# Patient Record
Sex: FEMALE | Race: WHITE | NOT HISPANIC OR LATINO
[De-identification: names, ages, dates, MRNs, and addresses within clinical notes are randomized per-mention and may not be internally consistent; named-entity substitution may affect disease eponyms.]

---

## 2017-11-09 ENCOUNTER — APPOINTMENT (OUTPATIENT)
Dept: OBGYN | Facility: CLINIC | Age: 82
End: 2017-11-09
Payer: MEDICARE

## 2017-11-09 ENCOUNTER — LABORATORY RESULT (OUTPATIENT)
Age: 82
End: 2017-11-09

## 2017-11-09 VITALS
HEIGHT: 62 IN | WEIGHT: 148 LBS | BODY MASS INDEX: 27.23 KG/M2 | SYSTOLIC BLOOD PRESSURE: 128 MMHG | DIASTOLIC BLOOD PRESSURE: 70 MMHG

## 2017-11-09 DIAGNOSIS — Z86.39 PERSONAL HISTORY OF OTHER ENDOCRINE, NUTRITIONAL AND METABOLIC DISEASE: ICD-10-CM

## 2017-11-09 DIAGNOSIS — Z86.79 PERSONAL HISTORY OF OTHER DISEASES OF THE CIRCULATORY SYSTEM: ICD-10-CM

## 2017-11-09 DIAGNOSIS — N81.5 VAGINAL ENTEROCELE: ICD-10-CM

## 2017-11-09 PROCEDURE — 81003 URINALYSIS AUTO W/O SCOPE: CPT | Mod: QW

## 2017-11-09 PROCEDURE — 99397 PER PM REEVAL EST PAT 65+ YR: CPT

## 2017-11-17 DIAGNOSIS — T83.510A INFECTION AND INFLAMMATORY REACTION DUE TO CYSTOSTOMY CATHETER, INITIAL ENCOUNTER: ICD-10-CM

## 2017-11-17 DIAGNOSIS — N39.0 INFECTION AND INFLAMMATORY REACTION DUE TO CYSTOSTOMY CATHETER, INITIAL ENCOUNTER: ICD-10-CM

## 2017-11-17 DIAGNOSIS — N30.00 ACUTE CYSTITIS W/OUT HEMATURIA: ICD-10-CM

## 2017-11-21 ENCOUNTER — RX RENEWAL (OUTPATIENT)
Age: 82
End: 2017-11-21

## 2017-11-21 ENCOUNTER — RESULT REVIEW (OUTPATIENT)
Age: 82
End: 2017-11-21

## 2017-11-21 ENCOUNTER — MESSAGE (OUTPATIENT)
Age: 82
End: 2017-11-21

## 2017-11-21 LAB
APPEARANCE: ABNORMAL
BILIRUBIN URINE: NEGATIVE
BLOOD URINE: ABNORMAL
COLOR: YELLOW
GLUCOSE QUALITATIVE U: NEGATIVE MG/DL
KETONES URINE: NEGATIVE
LEUKOCYTE ESTERASE URINE: ABNORMAL
NITRITE URINE: POSITIVE
PH URINE: 6
PROTEIN URINE: NEGATIVE MG/DL
SPECIFIC GRAVITY URINE: 1.01
UROBILINOGEN URINE: NEGATIVE MG/DL

## 2017-11-28 ENCOUNTER — OTHER (OUTPATIENT)
Age: 82
End: 2017-11-28

## 2017-11-30 ENCOUNTER — RESULT REVIEW (OUTPATIENT)
Age: 82
End: 2017-11-30

## 2018-01-03 LAB
BILIRUB UR QL STRIP: NORMAL
CLARITY UR: CLEAR
COLLECTION METHOD: NORMAL
CYTOLOGY CVX/VAG DOC THIN PREP: NORMAL
GLUCOSE UR-MCNC: NORMAL
HCG UR QL: 0.2 EU/DL
HGB UR QL STRIP.AUTO: NORMAL
KETONES UR-MCNC: NORMAL
LEUKOCYTE ESTERASE UR QL STRIP: NORMAL
NITRITE UR QL STRIP: POSITIVE
PH UR STRIP: 5.5
PROT UR STRIP-MCNC: NORMAL
SP GR UR STRIP: 1.01

## 2018-05-25 ENCOUNTER — INPATIENT (INPATIENT)
Facility: HOSPITAL | Age: 83
LOS: 0 days | Discharge: HOME HEALTH | End: 2018-05-26
Attending: FAMILY MEDICINE | Admitting: FAMILY MEDICINE
Payer: MEDICARE

## 2018-05-25 VITALS — WEIGHT: 153 LBS | HEIGHT: 65 IN

## 2018-05-25 DIAGNOSIS — Z90.710 ACQUIRED ABSENCE OF BOTH CERVIX AND UTERUS: Chronic | ICD-10-CM

## 2018-05-25 LAB
ALBUMIN SERPL ELPH-MCNC: 4.2 G/DL — SIGNIFICANT CHANGE UP (ref 3.3–5)
ALP SERPL-CCNC: 64 U/L — SIGNIFICANT CHANGE UP (ref 40–120)
ALT FLD-CCNC: 28 U/L — SIGNIFICANT CHANGE UP (ref 12–78)
ANION GAP SERPL CALC-SCNC: 5 MMOL/L — SIGNIFICANT CHANGE UP (ref 5–17)
APTT BLD: 29.2 SEC — SIGNIFICANT CHANGE UP (ref 27.5–37.4)
AST SERPL-CCNC: 19 U/L — SIGNIFICANT CHANGE UP (ref 15–37)
BASOPHILS # BLD AUTO: 0.06 K/UL — SIGNIFICANT CHANGE UP (ref 0–0.2)
BASOPHILS NFR BLD AUTO: 0.7 % — SIGNIFICANT CHANGE UP (ref 0–2)
BILIRUB SERPL-MCNC: 0.8 MG/DL — SIGNIFICANT CHANGE UP (ref 0.2–1.2)
BLD GP AB SCN SERPL QL: SIGNIFICANT CHANGE UP
BUN SERPL-MCNC: 26 MG/DL — HIGH (ref 7–23)
CALCIUM SERPL-MCNC: 9.7 MG/DL — SIGNIFICANT CHANGE UP (ref 8.5–10.1)
CHLORIDE SERPL-SCNC: 101 MMOL/L — SIGNIFICANT CHANGE UP (ref 96–108)
CK SERPL-CCNC: 131 U/L — SIGNIFICANT CHANGE UP (ref 26–192)
CO2 SERPL-SCNC: 28 MMOL/L — SIGNIFICANT CHANGE UP (ref 22–31)
CREAT SERPL-MCNC: 1.11 MG/DL — SIGNIFICANT CHANGE UP (ref 0.5–1.3)
EOSINOPHIL # BLD AUTO: 0.15 K/UL — SIGNIFICANT CHANGE UP (ref 0–0.5)
EOSINOPHIL NFR BLD AUTO: 1.7 % — SIGNIFICANT CHANGE UP (ref 0–6)
GLUCOSE SERPL-MCNC: 144 MG/DL — HIGH (ref 70–99)
HCT VFR BLD CALC: 39 % — SIGNIFICANT CHANGE UP (ref 34.5–45)
HGB BLD-MCNC: 13.1 G/DL — SIGNIFICANT CHANGE UP (ref 11.5–15.5)
IMM GRANULOCYTES NFR BLD AUTO: 0.1 % — SIGNIFICANT CHANGE UP (ref 0–1.5)
INR BLD: 0.99 RATIO — SIGNIFICANT CHANGE UP (ref 0.88–1.16)
LYMPHOCYTES # BLD AUTO: 2.28 K/UL — SIGNIFICANT CHANGE UP (ref 1–3.3)
LYMPHOCYTES # BLD AUTO: 25.9 % — SIGNIFICANT CHANGE UP (ref 13–44)
MAGNESIUM SERPL-MCNC: 2.6 MG/DL — SIGNIFICANT CHANGE UP (ref 1.6–2.6)
MCHC RBC-ENTMCNC: 30 PG — SIGNIFICANT CHANGE UP (ref 27–34)
MCHC RBC-ENTMCNC: 33.6 GM/DL — SIGNIFICANT CHANGE UP (ref 32–36)
MCV RBC AUTO: 89.2 FL — SIGNIFICANT CHANGE UP (ref 80–100)
MONOCYTES # BLD AUTO: 0.6 K/UL — SIGNIFICANT CHANGE UP (ref 0–0.9)
MONOCYTES NFR BLD AUTO: 6.8 % — SIGNIFICANT CHANGE UP (ref 2–14)
NEUTROPHILS # BLD AUTO: 5.72 K/UL — SIGNIFICANT CHANGE UP (ref 1.8–7.4)
NEUTROPHILS NFR BLD AUTO: 64.8 % — SIGNIFICANT CHANGE UP (ref 43–77)
NRBC # BLD: 0 /100 WBCS — SIGNIFICANT CHANGE UP (ref 0–0)
PHOSPHATE SERPL-MCNC: 3.7 MG/DL — SIGNIFICANT CHANGE UP (ref 2.5–4.5)
PLATELET # BLD AUTO: 192 K/UL — SIGNIFICANT CHANGE UP (ref 150–400)
POTASSIUM SERPL-MCNC: 3.7 MMOL/L — SIGNIFICANT CHANGE UP (ref 3.5–5.3)
POTASSIUM SERPL-SCNC: 3.7 MMOL/L — SIGNIFICANT CHANGE UP (ref 3.5–5.3)
PROT SERPL-MCNC: 8.1 GM/DL — SIGNIFICANT CHANGE UP (ref 6–8.3)
PROTHROM AB SERPL-ACNC: 10.7 SEC — SIGNIFICANT CHANGE UP (ref 9.8–12.7)
RBC # BLD: 4.37 M/UL — SIGNIFICANT CHANGE UP (ref 3.8–5.2)
RBC # FLD: 13.2 % — SIGNIFICANT CHANGE UP (ref 10.3–14.5)
SODIUM SERPL-SCNC: 134 MMOL/L — LOW (ref 135–145)
TROPONIN I SERPL-MCNC: <0.015 NG/ML — SIGNIFICANT CHANGE UP (ref 0.01–0.04)
TYPE + AB SCN PNL BLD: SIGNIFICANT CHANGE UP
WBC # BLD: 8.82 K/UL — SIGNIFICANT CHANGE UP (ref 3.8–10.5)
WBC # FLD AUTO: 8.82 K/UL — SIGNIFICANT CHANGE UP (ref 3.8–10.5)

## 2018-05-25 PROCEDURE — 99291 CRITICAL CARE FIRST HOUR: CPT

## 2018-05-25 PROCEDURE — 70544 MR ANGIOGRAPHY HEAD W/O DYE: CPT | Mod: 26,59

## 2018-05-25 PROCEDURE — 70551 MRI BRAIN STEM W/O DYE: CPT | Mod: 26

## 2018-05-25 PROCEDURE — 70450 CT HEAD/BRAIN W/O DYE: CPT | Mod: 26,77

## 2018-05-25 PROCEDURE — 93010 ELECTROCARDIOGRAM REPORT: CPT

## 2018-05-25 PROCEDURE — 70547 MR ANGIOGRAPHY NECK W/O DYE: CPT | Mod: 26

## 2018-05-25 PROCEDURE — 70450 CT HEAD/BRAIN W/O DYE: CPT | Mod: 26

## 2018-05-25 RX ORDER — ALPRAZOLAM 0.25 MG
0.25 TABLET ORAL ONCE
Qty: 0 | Refills: 0 | Status: DISCONTINUED | OUTPATIENT
Start: 2018-05-25 | End: 2018-05-25

## 2018-05-25 RX ORDER — CHOLECALCIFEROL (VITAMIN D3) 125 MCG
1000 CAPSULE ORAL DAILY
Qty: 0 | Refills: 0 | Status: DISCONTINUED | OUTPATIENT
Start: 2018-05-25 | End: 2018-05-26

## 2018-05-25 RX ORDER — LABETALOL HCL 100 MG
10 TABLET ORAL ONCE
Qty: 0 | Refills: 0 | Status: COMPLETED | OUTPATIENT
Start: 2018-05-25 | End: 2018-05-25

## 2018-05-25 RX ORDER — CARVEDILOL PHOSPHATE 80 MG/1
1 CAPSULE, EXTENDED RELEASE ORAL
Qty: 0 | Refills: 0 | COMMUNITY

## 2018-05-25 RX ORDER — LOSARTAN/HYDROCHLOROTHIAZIDE 100MG-25MG
1 TABLET ORAL
Qty: 0 | Refills: 0 | COMMUNITY

## 2018-05-25 RX ORDER — OMEGA-3 ACID ETHYL ESTERS 1 G
1 CAPSULE ORAL
Qty: 0 | Refills: 0 | COMMUNITY

## 2018-05-25 RX ORDER — LOSARTAN POTASSIUM 100 MG/1
100 TABLET, FILM COATED ORAL DAILY
Qty: 0 | Refills: 0 | Status: DISCONTINUED | OUTPATIENT
Start: 2018-05-25 | End: 2018-05-26

## 2018-05-25 RX ORDER — ACETAMINOPHEN 500 MG
650 TABLET ORAL EVERY 6 HOURS
Qty: 0 | Refills: 0 | Status: DISCONTINUED | OUTPATIENT
Start: 2018-05-25 | End: 2018-05-26

## 2018-05-25 RX ORDER — CHOLECALCIFEROL (VITAMIN D3) 125 MCG
1 CAPSULE ORAL
Qty: 0 | Refills: 0 | COMMUNITY

## 2018-05-25 RX ORDER — ATORVASTATIN CALCIUM 80 MG/1
40 TABLET, FILM COATED ORAL AT BEDTIME
Qty: 0 | Refills: 0 | Status: DISCONTINUED | OUTPATIENT
Start: 2018-05-25 | End: 2018-05-26

## 2018-05-25 RX ORDER — LEVOTHYROXINE SODIUM 125 MCG
75 TABLET ORAL DAILY
Qty: 0 | Refills: 0 | Status: DISCONTINUED | OUTPATIENT
Start: 2018-05-25 | End: 2018-05-26

## 2018-05-25 RX ORDER — CARVEDILOL PHOSPHATE 80 MG/1
25 CAPSULE, EXTENDED RELEASE ORAL EVERY 12 HOURS
Qty: 0 | Refills: 0 | Status: DISCONTINUED | OUTPATIENT
Start: 2018-05-25 | End: 2018-05-26

## 2018-05-25 RX ORDER — ALPRAZOLAM 0.25 MG
0.5 TABLET ORAL
Qty: 0 | Refills: 0 | Status: DISCONTINUED | OUTPATIENT
Start: 2018-05-25 | End: 2018-05-26

## 2018-05-25 RX ORDER — SENNA PLUS 8.6 MG/1
2 TABLET ORAL AT BEDTIME
Qty: 0 | Refills: 0 | Status: DISCONTINUED | OUTPATIENT
Start: 2018-05-25 | End: 2018-05-26

## 2018-05-25 RX ORDER — ALPRAZOLAM 0.25 MG
1 TABLET ORAL
Qty: 0 | Refills: 0 | COMMUNITY

## 2018-05-25 RX ORDER — ONDANSETRON 8 MG/1
4 TABLET, FILM COATED ORAL EVERY 6 HOURS
Qty: 0 | Refills: 0 | Status: DISCONTINUED | OUTPATIENT
Start: 2018-05-25 | End: 2018-05-26

## 2018-05-25 RX ORDER — LEVOTHYROXINE SODIUM 125 MCG
1 TABLET ORAL
Qty: 0 | Refills: 0 | COMMUNITY

## 2018-05-25 RX ADMIN — ATORVASTATIN CALCIUM 40 MILLIGRAM(S): 80 TABLET, FILM COATED ORAL at 22:56

## 2018-05-25 RX ADMIN — LOSARTAN POTASSIUM 100 MILLIGRAM(S): 100 TABLET, FILM COATED ORAL at 16:57

## 2018-05-25 RX ADMIN — CARVEDILOL PHOSPHATE 25 MILLIGRAM(S): 80 CAPSULE, EXTENDED RELEASE ORAL at 16:56

## 2018-05-25 RX ADMIN — Medication 0.25 MILLIGRAM(S): at 04:59

## 2018-05-25 RX ADMIN — Medication 1000 UNIT(S): at 16:56

## 2018-05-25 NOTE — ED PROVIDER NOTE - CARE PLAN
Principal Discharge DX:	Headache  Secondary Diagnosis:	Concussion Principal Discharge DX:	Headache  Secondary Diagnosis:	Concussion  Secondary Diagnosis:	Intracranial bleeding

## 2018-05-25 NOTE — H&P ADULT - NSHPPHYSICALEXAM_GEN_ALL_CORE
PHYSICAL EXAM:    Constitutional: NAD, awake and alert, well-developed  HEENT: PERR, EOMI, Normal Hearing, MMM  Neck: Soft and supple, No LAD, No JVD  Respiratory: Breath sounds are clear bilaterally, No wheezing, rales or rhonchi  Cardiovascular: S1 and S2, regular rate and rhythm, no Murmurs, gallops or rubs  Gastrointestinal: Bowel Sounds present, soft, nontender, nondistended, no guarding, no rebound  Extremities: No peripheral edema  Vascular: 2+ peripheral pulses  Neurological: A/O x 3, no focal deficits  Musculoskeletal: 5/5 strength b/l upper and lower extremities  Skin: No rashes  rectal : deferred, not indicated

## 2018-05-25 NOTE — ED PROVIDER NOTE - OBJECTIVE STATEMENT
pt presents afgter mchanical fall while watering plants with post occipital heamtoma acchymosis non expansile and post constant achy non radiating HA no loc on asa no other blood thinners. denies fever. denies  neck pain. no chest pain or sob. no abd pain. no n/v/d. no urinary f/u/d. no back pain. no motor or sensory deficits. denies illicit drug use.  . no other acute issues symptoms or concerns

## 2018-05-25 NOTE — ED ADULT TRIAGE NOTE - CHIEF COMPLAINT QUOTE
trip and fall on patio at 6pm today, hit back of head. no LOC. reports brief period of nausea after fall, now resolved. on aspirin. seen at urgent care, sent from urgent care to ED for CT scan. patient a+ox4, PERRL, respirations even and unlabored, ms strength 5/5 upper and lower extremities bilaterally. ambulatory with steady gai. denies pain at this time.

## 2018-05-25 NOTE — ED PROVIDER NOTE - CRITICAL CARE PROVIDED
consultation with other physicians/interpretation of diagnostic studies/direct patient care (not related to procedure)/documentation/consult w/ pt's family directly relating to pts condition/additional history taking

## 2018-05-25 NOTE — ED ADULT NURSE NOTE - OBJECTIVE STATEMENT
Pt presents to the ED after a fall at home.  Pt states that she hit the back of her head, denies LOC, dizziness, nausea/vomiting. Pt is on aspirin and was sent from the urgent care for a CT of the head.

## 2018-05-25 NOTE — ED ADULT NURSE REASSESSMENT NOTE - COMFORT CARE
ambulated to bathroom
nothing else needed at this time. BG/assisted to bedpan
assisted to bedpan/plan of care explained
assisted to bedpan/plan of care explained/ice chips
meal provided/plan of care explained/po fluids offered
plan of care explained
plan of care explained

## 2018-05-25 NOTE — CONSULT NOTE ADULT - SUBJECTIVE AND OBJECTIVE BOX
Patient is a 86y old  Female who presents with a chief complaint of S/P fall, no LOC  Asymptomatic at present  CT/MRI revealing a small hemorrhagic stroke.  Denies CP/SOB or any other symptoms    HPI:HTN/Hyperlipidemia      PAST MEDICAL & SURGICAL HISTORY:  No significant past surgical history      HPI:                PREVIOUS DIAGNOSTIC TESTING:      ECHO  FINDINGS:    STRESS  FINDINGS:    CATHETERIZATION  FINDINGS:    MEDICATIONS  (STANDING):    MEDICATIONS  (PRN):      FAMILY HISTORY:  No pertinent family history in first degree relatives      SOCIAL HISTORY:    CIGARETTES:    ALCOHOL:    REVIEW OF SYSTEMS:  CONSTITUTIONAL:  No night sweats.  No fatigue, malaise, lethargy.  No fever or chills.  HEENT:  Eyes:  No visual changes.  No eye pain.  No eye discharge.    ENT:  No runny nose.  No epistaxis.  No sinus pain.  No sore throat.  No odynophagia.  No ear pain.  No congestion.  RESPIRATORY:  No cough.  No wheeze.  No hemoptysis.  No shortness of breath.  CARDIOVASCULAR:  No chest pains.  No palpitations. No shortness of breath, orthopnea or PND.  GASTROINTESTINAL:  No abdominal pain.  No nausea or vomiting.  No diarrhea or constipation.  No hematemesis.  No hematochezia.  No melena.  GENITOURINARY:  No urgency.  No frequency.  No dysuria.  No hematuria.  No obstructive symptoms.  No discharge.  No pain.  No significant abnormal bleeding.  MUSCULOSKELETAL:  No musculoskeletal pain.  No joint swelling.  No arthritis.  NEUROLOGICAL:  No tingling or numbness or weakness.  PSYCHIATRIC:  No confusion  SKIN:  No rashes.  No lesions.  No wounds.  ENDOCRINE:  No unexplained weight loss.  No polydipsia.  No polyuria.  No polyphagia.  HEMATOLOGIC:  No anemia.  No purpura.  No petechiae.  No prolonged or excessive bleeding.   ALLERGIC AND IMMUNOLOGIC:  No pruritus.  No swelling.         Vital Signs Last 24 Hrs  T(C): 36.7 (25 May 2018 12:34), Max: 36.7 (25 May 2018 12:34)  T(F): 98 (25 May 2018 12:34), Max: 98 (25 May 2018 12:34)  HR: 76 (25 May 2018 12:34) (69 - 76)  BP: 141/57 (25 May 2018 12:34) (141/57 - 201/66)  BP(mean): --  RR: 17 (25 May 2018 12:34) (16 - 18)  SpO2: 99% (25 May 2018 12:34) (96% - 99%)    PHYSICAL EXAM-    Constitutional: The patient appears to be normal, well developed, well nourished and alert and oriented to time, place and person. The patient does not appear acutely ill. The patient is alert.     Head: Head is normocephalic and atraumatic.      Neck: The patient's neck is supple without enlargement, has no palpable thyromegaly nor thyroid nodules and has no jugular venous distention. No audible carotid bruits. There are strong carotid pulses bilaterally. No JVD.     Cardiovascular: Regular rate and rhythm without S3, S4. No murmurs or rubs are appreciated.      Respiratory: The patient has no rales and no rhonchi. The patient has no wheezes.     Abdomen: Soft, nontender, nondistended with positive bowel sounds.      Extremity: No tenderness. There is no pitting edema, skin discoloration, clubbing and cyanosis.         INTERPRETATION OF TELEMETRY:    ECG:    I&O's Detail      LABS:                        13.1   8.82  )-----------( 192      ( 25 May 2018 05:07 )             39.0     05-25    134<L>  |  101  |  26<H>  ----------------------------<  144<H>  3.7   |  28  |  1.11    Ca    9.7      25 May 2018 05:07  Phos  3.7     05-25  Mg     2.6     05-25    TPro  8.1  /  Alb  4.2  /  TBili  0.8  /  DBili  x   /  AST  19  /  ALT  28  /  AlkPhos  64  05-25    CARDIAC MARKERS ( 25 May 2018 09:32 )  <0.015 ng/mL / x     / 131 U/L / x     / x          PT/INR - ( 25 May 2018 05:07 )   PT: 10.7 sec;   INR: 0.99 ratio         PTT - ( 25 May 2018 05:07 )  PTT:29.2 sec    I&O's Summary    BNP  RADIOLOGY & ADDITIONAL STUDIES:

## 2018-05-25 NOTE — CONSULT NOTE ADULT - SUBJECTIVE AND OBJECTIVE BOX
Neurosurgery:    86F pmhx of HTN, HLD, Hypothyroid, Anxiety, who was at home today and sustained a fall outside on her wooden deck around 6pm.  Pt fell back while holding 2 plants in her hand hitting her head (Head vs wooden deck) no LOC, + ASA use 81mg intermittently. Pt continued with daily activities and daughter of patient saw her at 7pm and recommended going to urgent care, as pt had minor c/o nausea with a questionable minor HA.  Urgent care team recommended CTH evaluation as a screening protocol at .  Pt had CTH of the brain at 2am demonstrating a Right posterior thalamic 2cm oblong hemorrhage, no IVH or hydrocephalus.  ICH score of zero.  Pt remains neuro intact with resolution of minor HA / feeling of nausea.  Pt asked to be evaluated by neurosurgery.     PAST MEDICAL & SURGICAL HISTORY:  PsHx of prolapsed bladder with sling procedure 3-4 years ago.       FAMILY HISTORY:  No pertinent family history in first degree relatives     Social Hx:  Nonsmoker, no drug or alcohol use  Pt independent, lives at home with her  (89 years of age)    Allergies  No Known Allergies    MEDICATIONS (OUTPATIENT):  Losartan/HCTZ 100/25  Carvedilol 25mg BID  Synthroid 75mg Daily  Crestor daily  ASA 81mg daily  Xanax 0.25mg daily prn anxiety    MEDICATIONS  (STANDING):  ALPRAZolam 0.25 milliGRAM(s) Oral Once for anxiety    ROS: Pertinent positives in HPI, all other ROS were reviewed and are negative.      Vital Signs Last 24 Hrs  T(C): 36.6 (25 May 2018 00:41), Max: 36.6 (25 May 2018 00:41)  T(F): 97.9 (25 May 2018 00:41), Max: 97.9 (25 May 2018 00:41)  HR: 74 (25 May 2018 03:00) (72 - 74)  BP: 162/74 (25 May 2018 03:00) (162/74 - 201/66)  BP(mean): --  RR: 18 (25 May 2018 03:00) (17 - 18)  SpO2: 98% (25 May 2018 03:00) (96% - 98%)    Labs:  Non available at this time    Radiology report:  - CT head:  Elongated curvilinear hyperdense focus within the right thalamus   measuring up to 1.7 cm in craniocaudal dimension up to 2 cm in transverse   dimension. No surrounding edema or significant mass effect upon the right   lateral ventricle. No intraventricular hemorrhage. Considerations include   thalamic hemorrhage, vascular malformation, or calcification. MRIof the   brain is recommended given lack of prior imaging for comparison.    Physical Exam:  Constitutional: Awake / alert  HEENT: PERRLA, EOMI, High post parietal STS at impact site, no laceration / bleeding  Neck: Supple, no neck pain with ROM, no palpable tenderness along the midline  Respiratory: CTA b/l  Cardiovascular: S1 and S2  Gastrointestinal: Soft, NT/ND  Extremities:  no edema  Vascular: No carotid Bruit  Musculoskeletal: no joint swelling/tenderness, no abnormal movements  Skin: No rashes    Neurological Exam:  Pt intact  HF: A x O x 3, appropriately interactive, normal affect, speech fluent, no aphasia or paraphasic errors. Naming /repetition intact   CN: PERRL, EOMI, VFF, facial sensation normal, no NLFD, tongue midline  Motor: No pronator drift, Strength 5/5 in all 4 ext, normal bulk and tone, no tremor, rigidity or bradykinesia  Sens: Intact to light touch  Reflexes: Symmetric and normal, downgoing toes b/l  Coord:  No FNFA, dysmetria, THERON intact   Gait/Balance: Steady tandem / observed walking around the bed    A/P: 86F s/p fall with impact to post parietal area, no LOC. Hx of ASA use intermittently for 'hardening of the arteries'.  Pt came to ER for CTH evaluation per recommendations from urgent care center.  CTH demonstrates linear / banana shaped / elongated post thalamic hyperdensity that could be blood vs Ca+ vs vascular anomolie.  Pt remains fully neurologically intact, is non toxic appearing and at neuro baseline per 2 granddaughters at bedside.     - Recommend MRI of Brain +/-  - Pt needs CTH 8-12 hours after initial CT scan to r/o progression if this is c/w ICH in post thalamic area  - SBP < 160  - Neuro checks q 2 hour  - may give xanax  x 1 for anxiety at this time  - full labs are required CBC, Chem 8, Mg, Phos, PT/PTT/INR, T&S,   - DVT prophylaxis  - Pt passed bedside swallow eval without issue  - Non-surgical intracranial hemorrhage - Pt's imaging / history / and physical exam d/w Dr. Geiger - attending neurosurgeon.  - Recommend Neurology eval after MRI of brain is complete, along with close monitoring in ED until MRI is complete Neurosurgery:    86F pmhx of HTN, HLD, Hypothyroid, Anxiety, who was at home today and sustained a fall outside on her wooden deck around 6pm.  Pt fell back while holding 2 plants in her hand hitting her head (Head vs wooden deck) no LOC, + ASA use 81mg intermittently. Pt continued with daily activities and daughter of patient saw her at 7pm and recommended going to urgent care, as pt had minor c/o nausea with a questionable minor HA.  Urgent care team recommended CTH evaluation as a screening protocol at .  Pt had CTH of the brain at 2am demonstrating a Right posterior thalamic 2cm oblong hyperdenisty ?hemorrhage, no IVH or hydrocephalus.  ICH score of zero.  Pt remains neuro intact with resolution of minor HA / feeling of nausea.  Pt asked to be evaluated by neurosurgery.     PAST MEDICAL & SURGICAL HISTORY:  PsHx of prolapsed bladder with sling procedure 3-4 years ago.       FAMILY HISTORY:  No pertinent family history in first degree relatives     Social Hx:  Nonsmoker, no drug or alcohol use  Pt independent, lives at home with her  (89 years of age)    Allergies  No Known Allergies    MEDICATIONS (OUTPATIENT):  Losartan/HCTZ 100/25  Carvedilol 25mg BID  Synthroid 75mg Daily  Crestor daily  ASA 81mg daily  Xanax 0.25mg daily prn anxiety    MEDICATIONS  (STANDING):  ALPRAZolam 0.25 milliGRAM(s) Oral Once for anxiety    ROS: Pertinent positives in HPI, all other ROS were reviewed and are negative.      Vital Signs Last 24 Hrs  T(C): 36.6 (25 May 2018 00:41), Max: 36.6 (25 May 2018 00:41)  T(F): 97.9 (25 May 2018 00:41), Max: 97.9 (25 May 2018 00:41)  HR: 74 (25 May 2018 03:00) (72 - 74)  BP: 162/74 (25 May 2018 03:00) (162/74 - 201/66)  BP(mean): --  RR: 18 (25 May 2018 03:00) (17 - 18)  SpO2: 98% (25 May 2018 03:00) (96% - 98%)    Labs:  Non available at this time    Radiology report:  - CT head:  Elongated curvilinear hyperdense focus within the right thalamus   measuring up to 1.7 cm in craniocaudal dimension up to 2 cm in transverse   dimension. No surrounding edema or significant mass effect upon the right   lateral ventricle. No intraventricular hemorrhage. Considerations include   thalamic hemorrhage, vascular malformation, or calcification. MRIof the   brain is recommended given lack of prior imaging for comparison.    Physical Exam:  Constitutional: Awake / alert  HEENT: PERRLA, EOMI, High post parietal STS at impact site, no laceration / bleeding  Neck: Supple, no neck pain with ROM, no palpable tenderness along the midline  Respiratory: CTA b/l  Cardiovascular: S1 and S2  Gastrointestinal: Soft, NT/ND  Extremities:  no edema  Vascular: No carotid Bruit  Musculoskeletal: no joint swelling/tenderness, no abnormal movements  Skin: No rashes    Neurological Exam:  Pt intact  HF: A x O x 3, appropriately interactive, normal affect, speech fluent, no aphasia or paraphasic errors. Naming /repetition intact   CN: PERRL, EOMI, VFF, facial sensation normal, no NLFD, tongue midline  Motor: No pronator drift, Strength 5/5 in all 4 ext, normal bulk and tone, no tremor, rigidity or bradykinesia  Sens: Intact to light touch  Reflexes: Symmetric and normal, downgoing toes b/l  Coord:  No FNFA, dysmetria, THERON intact   Gait/Balance: Steady tandem / observed walking around the bed    A/P: 86F s/p fall with impact to post parietal area, no LOC. Hx of ASA use intermittently for 'hardening of the arteries'.  Pt came to ER for CTH evaluation per recommendations from urgent care center.  CTH demonstrates linear / banana shaped / elongated post thalamic hyperdensity that could be blood vs Ca+ vs vascular anomolie.  Pt remains fully neurologically intact, is non toxic appearing and at neuro baseline per 2 granddaughters at bedside.     - Recommend MRI of Brain +/-  - Pt needs CTH 8-12 hours after initial CT scan to r/o progression if this is c/w ICH in post thalamic area  - SBP < 160  - Neuro checks q 2 hour  - may give xanax  x 1 for anxiety at this time  - full labs are required CBC, Chem 8, Mg, Phos, PT/PTT/INR, T&S,   - DVT prophylaxis  - Pt passed bedside swallow eval without issue  - Non-surgical intracranial hemorrhage - Pt's imaging / history / and physical exam d/w Dr. Geiger - attending neurosurgeon.  - Recommend Neurology eval after MRI of brain is complete, along with close monitoring in ED until MRI is complete

## 2018-05-25 NOTE — ED PROVIDER NOTE - MEDICAL DECISION MAKING DETAILS
acting appropiate per nicolaster. return to ed for intractable HA, persistent vomiting, or new onset motor/sensory deficits multiple bedside reassessments neuro status and family updated bp control with htn bleed gcs 15 possible traumatic bleed as well I feel more likely htn bleed causing fall and I will treat bp to systolic less than 150 multiple bedside reassesments neuro and cardiovasculat status with hemodynamic monitoring confirmed only on daily asa no other blood thinners

## 2018-05-25 NOTE — H&P ADULT - HISTORY OF PRESENT ILLNESS
cc - headache, fall       86 female with PMH of Anxiety,  HTN, HLD, Hypothyroid, HLD presents to  after fall at home . She reports tripping and falling outside on her deck around 6pm.  Pt fell backward hitting her head,  while holding 2 plants in her hand, denies LOC, + ASA use 81mg intermittently.  Denies Cp, dizziness, palpitations, cough, pain prior event. Patient did reports frontal headache 5/10, denies nausea, vomiting, numbness, weakness.     In ED -  T(F): 98 , Max: 98 HR: 76  (69 - 76) BP: 141/57 ((141/57 - 201/66) RR: 17  (16 - 18) SpO2: 99% ( (96% - 99%) Pt had CTH of the brain  Elongated curvilinear hyperdense focus within the right thalamus  measuring up to 1.7 cm in craniocaudal dimension up to 2 cm in transverse  dimension.  ICH score of zero.   EKG - sinus to ST-T changes, Na 134, Cr 1.1, troponin <0.015  MRI of the brain -  acute right thalamus hemorrhage , repeat CT head  at 1:50 am Stable curvilinear hyperdensity along the right medial and posterior thalamus without surrounding edema. This most likely represents  calcification and not hemorrhage.

## 2018-05-25 NOTE — CONSULT NOTE ADULT - ASSESSMENT
Hemorhagic stroke  S/P fall  MRI positive for small bleed  Asymptomatic, hemodynamically stable  Neuro eval pending  Will admit for observation  BP control  Will follow

## 2018-05-25 NOTE — H&P ADULT - NSHPREVIEWOFSYSTEMS_GEN_ALL_CORE
REVIEW OF SYSTEMS:    CONSTITUTIONAL: No weakness, fevers or chills  EYES/ENT: No visual changes;  No vertigo or throat pain   NECK: No pain or stiffness  RESPIRATORY: No cough, wheezing, hemoptysis; No shortness of breath  CARDIOVASCULAR: No chest pain or palpitations  GASTROINTESTINAL: No abdominal or epigastric pain. No nausea, vomiting, or hematemesis; No diarrhea or constipation. No melena or hematochezia.  GENITOURINARY: No dysuria, frequency or hematuria  NEUROLOGICAL: No numbness or weakness, + HA  SKIN: No itching, burning, rashes, or lesions   All other review of systems is negative unless indicated above.

## 2018-05-25 NOTE — ED PROVIDER NOTE - PROGRESS NOTE DETAILS
spoke with mr fu milagros and teleradio attending agree to emergent mri head rule out acute bleed pt stable gcs 15 no new neuro deficits family awre of plan of care mr fu coming in now to do study no new neuro deficits gcs 15 neurosurg pa updated on bleed on mri pt stable am atrtedning aware of plan of car neurosurge coming to revlaluate just spoke with geovani manning

## 2018-05-26 ENCOUNTER — TRANSCRIPTION ENCOUNTER (OUTPATIENT)
Age: 83
End: 2018-05-26

## 2018-05-26 VITALS
DIASTOLIC BLOOD PRESSURE: 42 MMHG | HEART RATE: 72 BPM | RESPIRATION RATE: 18 BRPM | TEMPERATURE: 98 F | OXYGEN SATURATION: 98 % | SYSTOLIC BLOOD PRESSURE: 100 MMHG

## 2018-05-26 LAB
ANION GAP SERPL CALC-SCNC: 8 MMOL/L — SIGNIFICANT CHANGE UP (ref 5–17)
BASOPHILS # BLD AUTO: 0.07 K/UL — SIGNIFICANT CHANGE UP (ref 0–0.2)
BASOPHILS NFR BLD AUTO: 1 % — SIGNIFICANT CHANGE UP (ref 0–2)
BUN SERPL-MCNC: 29 MG/DL — HIGH (ref 7–23)
CALCIUM SERPL-MCNC: 9.1 MG/DL — SIGNIFICANT CHANGE UP (ref 8.5–10.1)
CHLORIDE SERPL-SCNC: 103 MMOL/L — SIGNIFICANT CHANGE UP (ref 96–108)
CHOLEST SERPL-MCNC: 146 MG/DL — SIGNIFICANT CHANGE UP (ref 10–199)
CK SERPL-CCNC: 143 U/L — SIGNIFICANT CHANGE UP (ref 26–192)
CO2 SERPL-SCNC: 28 MMOL/L — SIGNIFICANT CHANGE UP (ref 22–31)
CREAT SERPL-MCNC: 1.29 MG/DL — SIGNIFICANT CHANGE UP (ref 0.5–1.3)
EOSINOPHIL # BLD AUTO: 0.29 K/UL — SIGNIFICANT CHANGE UP (ref 0–0.5)
EOSINOPHIL NFR BLD AUTO: 4.2 % — SIGNIFICANT CHANGE UP (ref 0–6)
GLUCOSE SERPL-MCNC: 88 MG/DL — SIGNIFICANT CHANGE UP (ref 70–99)
HBA1C BLD-MCNC: 5.4 % — SIGNIFICANT CHANGE UP (ref 4–5.6)
HCT VFR BLD CALC: 35.3 % — SIGNIFICANT CHANGE UP (ref 34.5–45)
HDLC SERPL-MCNC: 48 MG/DL — SIGNIFICANT CHANGE UP (ref 40–125)
HGB BLD-MCNC: 11.9 G/DL — SIGNIFICANT CHANGE UP (ref 11.5–15.5)
IMM GRANULOCYTES NFR BLD AUTO: 0.1 % — SIGNIFICANT CHANGE UP (ref 0–1.5)
LIPID PNL WITH DIRECT LDL SERPL: 78 MG/DL — SIGNIFICANT CHANGE UP
LYMPHOCYTES # BLD AUTO: 2.29 K/UL — SIGNIFICANT CHANGE UP (ref 1–3.3)
LYMPHOCYTES # BLD AUTO: 32.8 % — SIGNIFICANT CHANGE UP (ref 13–44)
MAGNESIUM SERPL-MCNC: 2.4 MG/DL — SIGNIFICANT CHANGE UP (ref 1.6–2.6)
MCHC RBC-ENTMCNC: 30.6 PG — SIGNIFICANT CHANGE UP (ref 27–34)
MCHC RBC-ENTMCNC: 33.7 GM/DL — SIGNIFICANT CHANGE UP (ref 32–36)
MCV RBC AUTO: 90.7 FL — SIGNIFICANT CHANGE UP (ref 80–100)
MONOCYTES # BLD AUTO: 0.66 K/UL — SIGNIFICANT CHANGE UP (ref 0–0.9)
MONOCYTES NFR BLD AUTO: 9.5 % — SIGNIFICANT CHANGE UP (ref 2–14)
NEUTROPHILS # BLD AUTO: 3.66 K/UL — SIGNIFICANT CHANGE UP (ref 1.8–7.4)
NEUTROPHILS NFR BLD AUTO: 52.4 % — SIGNIFICANT CHANGE UP (ref 43–77)
NRBC # BLD: 0 /100 WBCS — SIGNIFICANT CHANGE UP (ref 0–0)
NT-PROBNP SERPL-SCNC: 688 PG/ML — HIGH (ref 0–450)
PHOSPHATE SERPL-MCNC: 3.4 MG/DL — SIGNIFICANT CHANGE UP (ref 2.5–4.5)
PLATELET # BLD AUTO: 173 K/UL — SIGNIFICANT CHANGE UP (ref 150–400)
POTASSIUM SERPL-MCNC: 3.9 MMOL/L — SIGNIFICANT CHANGE UP (ref 3.5–5.3)
POTASSIUM SERPL-SCNC: 3.9 MMOL/L — SIGNIFICANT CHANGE UP (ref 3.5–5.3)
RBC # BLD: 3.89 M/UL — SIGNIFICANT CHANGE UP (ref 3.8–5.2)
RBC # FLD: 13.6 % — SIGNIFICANT CHANGE UP (ref 10.3–14.5)
SODIUM SERPL-SCNC: 139 MMOL/L — SIGNIFICANT CHANGE UP (ref 135–145)
T4 FREE SERPL-MCNC: 1.15 NG/DL — SIGNIFICANT CHANGE UP (ref 0.76–1.46)
TOTAL CHOLESTEROL/HDL RATIO MEASUREMENT: 3 RATIO — LOW (ref 3.3–7.1)
TRIGL SERPL-MCNC: 99 MG/DL — SIGNIFICANT CHANGE UP (ref 10–149)
TROPONIN I SERPL-MCNC: <0.015 NG/ML — SIGNIFICANT CHANGE UP (ref 0.01–0.04)
TSH SERPL-MCNC: 0.68 UIU/ML — SIGNIFICANT CHANGE UP (ref 0.36–3.74)
WBC # BLD: 6.98 K/UL — SIGNIFICANT CHANGE UP (ref 3.8–10.5)
WBC # FLD AUTO: 6.98 K/UL — SIGNIFICANT CHANGE UP (ref 3.8–10.5)

## 2018-05-26 PROCEDURE — 99223 1ST HOSP IP/OBS HIGH 75: CPT

## 2018-05-26 RX ORDER — ASPIRIN/CALCIUM CARB/MAGNESIUM 324 MG
1 TABLET ORAL
Qty: 0 | Refills: 0 | COMMUNITY

## 2018-05-26 RX ORDER — LOSARTAN POTASSIUM 100 MG/1
1 TABLET, FILM COATED ORAL
Qty: 0 | Refills: 0 | COMMUNITY
Start: 2018-05-26

## 2018-05-26 RX ORDER — ACETAMINOPHEN 500 MG
2 TABLET ORAL
Qty: 0 | Refills: 0 | COMMUNITY
Start: 2018-05-26

## 2018-05-26 RX ADMIN — LOSARTAN POTASSIUM 100 MILLIGRAM(S): 100 TABLET, FILM COATED ORAL at 05:49

## 2018-05-26 RX ADMIN — CARVEDILOL PHOSPHATE 25 MILLIGRAM(S): 80 CAPSULE, EXTENDED RELEASE ORAL at 05:49

## 2018-05-26 RX ADMIN — Medication 1000 UNIT(S): at 13:06

## 2018-05-26 RX ADMIN — Medication 75 MICROGRAM(S): at 05:49

## 2018-05-26 NOTE — PHYSICAL THERAPY INITIAL EVALUATION ADULT - ADDITIONAL COMMENTS
Pt. was living in pvt home with . Pt. has 3 STP to enter + HR and 5 inside. Pt. was Ind with all ADL’s, amb with NAD + drive.

## 2018-05-26 NOTE — DISCHARGE NOTE ADULT - HOSPITAL COURSE
cc - headache, fall    86 female with PMH of Anxiety,  HTN, HLD, Hypothyroid, HLD presents to  after fall at home . She reports tripping and falling outside on her deck around 6pm.  Pt fell backward hitting her head,  while holding 2 plants in her hand, denies LOC, + ASA use 81mg intermittently.  Denies Cp, dizziness, palpitations, cough, pain prior event. Patient did reports frontal headache 5/10, denies nausea, vomiting, numbness, weakness.     In ED -  T(F): 98 , Max: 98 HR: 76  (69 - 76) BP: 141/57 ((141/57 - 201/66) RR: 17  (16 - 18) SpO2: 99% ( (96% - 99%) Pt had CTH of the brain  Elongated curvilinear hyperdense focus within the right thalamus  measuring up to 1.7 cm in craniocaudal dimension up to 2 cm in transverse  dimension.  ICH score of zero.   EKG - sinus to ST-T changes, Na 134, Cr 1.1, troponin <0.015  MRI of the brain -  acute right thalamus hemorrhage , repeat CT head  at 1:50 am Stable curvilinear hyperdensity along the right medial and posterior thalamus without surrounding edema. This most likely represents  calcification and not hemorrhage.    Pt was monitered on tele and ruled out for ACS.  She remains HD stable and brain imaging shows stable acute hemorrhage in the right thalamus.  Pt currently asymptomatic and will go home with strict BP control, statin therapy and outpatient follow up for repeat CT in 2-3 weeks.  She is to remain off aspirin in the mean time.     REVIEW OF SYSTEMS: All other review of systems is negative unless indicated above.    Vital Signs Last 24 Hrs  T(C): 36.9 (26 May 2018 10:18), Max: 36.9 (26 May 2018 10:18)  T(F): 98.4 (26 May 2018 10:18), Max: 98.4 (26 May 2018 10:18)  HR: 72 (26 May 2018 10:18) (65 - 78)  BP: 100/42 (26 May 2018 10:18) (100/42 - 151/55)  RR: 18 (26 May 2018 10:18) (17 - 20)  SpO2: 98% (26 May 2018 10:18) (97% - 98%)      Physical Exam:  Physical Exam: PHYSICAL EXAM:   Constitutional: NAD, awake and alert, well-developed  HEENT: PERR, EOMI, Normal Hearing, MMM  Neck: Soft and supple, No LAD, No JVD  Respiratory: Breath sounds are clear bilaterally, No wheezing, rales or rhonchi  Cardiovascular: S1 and S2, regular rate and rhythm, no Murmurs, gallops or rubs  Gastrointestinal: Bowel Sounds present, soft, nontender, nondistended, no guarding, no rebound  Extremities: No peripheral edema  Vascular: 2+ peripheral pulses  Neurological: A/O x 3, no focal deficits  Musculoskeletal: 5/5 strength b/l upper and lower extremities  Skin: No rashes rectal : deferred, not indicated	    meds/labs: reviewed.    Assessment and Plan:  86 female with PMH of Anxiety,  HTN, HLD, Hypothyroid, HLD presents to  after fall at home     Mechanical fall in setting of acute right thalamic hemorrhagic stroke:  ACS ruled out, no arrhythmias on tele. Pt is HD stable.  CVA lesion stable on repeat imaging.  Per neurosurgery and neurology pt can be discharged home off aspirin with follow up CT head on 2-3 weeks.  TSH WNL.  A1C = 5.4.    HTN: Resume losartan, coreg, HCTZ.  Close outpatient monitoring for goal BP < 140.    HLD: Continue statin therapy.  Lipid panel pending.    Anxiety: xanax prn but spoke about how it can increase risk of falls in elderly and to consider maintenance SSRI to control anxiety .    ASHLEY: RESOLVED.  HCTZ held on admission.  Can restart on discharge.  Encourage to drink plenty of fluids.    Hyponatremia, mild: RESOLVED.    Attending Statement: 40 minutes spent on total encounter; more than 50% of the visit was spent counseling and/or coordinating care by the attending physician.

## 2018-05-26 NOTE — DISCHARGE NOTE ADULT - CARE PLAN
Principal Discharge DX:	Hemorrhagic cerebrovascular accident (CVA)  Goal:	Prevention  Assessment and plan of treatment:	Take preventive meds as prescribed.  Goal is Blood pressure < 140 systolic.  Take anti-cholesterol medication.  Stop aspirin therapy until follow up for re-evaluation.  You should have CT scan of head in about 2-3 weeks.   Follow up with Dr. Guido neurologist or Dr. Alfonso neurosurgery.  Secondary Diagnosis:	Anxiety  Assessment and plan of treatment:	Caution with xanax as it can contribute to falls.  Consider long term therapy with an SSRI.  Discuss with your primary care doctor.

## 2018-05-26 NOTE — DISCHARGE NOTE ADULT - PATIENT PORTAL LINK FT
You can access the [facility] Patient Portal, offered by [facility], by registering with the following website: [email or web address]

## 2018-05-26 NOTE — PROGRESS NOTE ADULT - ASSESSMENT
86F s/p fall with impact to post parietal area, no LOC. Hx of ASA use intermittently presented from urgent care for CTH which showed linear elongated posterior thalamic hyperdensity. Stable on follow up MRI.     -No acute neurosurgical intervention  -Needs follow up CT head in 3-4 weeks  -Office appointment with Kaden after Ct head  -Clr for d/c home from nsx perspective  -Will sign off for now, reconsult PRN    Discussed with Dr Geiger

## 2018-05-26 NOTE — DISCHARGE NOTE ADULT - MEDICATION SUMMARY - MEDICATIONS TO TAKE
I will START or STAY ON the medications listed below when I get home from the hospital:    acetaminophen 325 mg oral tablet  -- 2 tab(s) by mouth every 6 hours, As needed, Mild Pain (1 - 3)  -- Indication: For pain    losartan 100 mg oral tablet  -- 1 tab(s) by mouth once a day  -- Indication: For Hypertension    losartan-hydrochlorothiazide 100mg-12.5mg oral tablet  -- 1 tab(s) by mouth once a day (in the evening)  -- Indication: For Hypertension    ALPRAZolam 0.5 mg oral tablet  -- 1 tab(s) by mouth 2 times a day, As Needed  -- Indication: For Anxiety    carvedilol 25 mg oral tablet  -- 1 tab(s) by mouth 2 times a day in the morning and afternoon  -- Indication: For Hypertension    Fish Oil oral capsule  -- 1 cap(s) by mouth once a day  -- Indication: For preventive    levothyroxine 75 mcg (0.075 mg) oral tablet  -- 1 tab(s) by mouth once a day  -- Indication: For Hypothyroidism    Vitamin D3 1000 intl units oral capsule  -- 1 cap(s) by mouth once a day  -- Indication: For vitamin D

## 2018-05-26 NOTE — CONSULT NOTE ADULT - SUBJECTIVE AND OBJECTIVE BOX
Patient is a 86y old  Female who presents with a chief complaint of fall, headache yesterday.      HPI:  cc - headache, fall       86 female with PMH of Anxiety,  HTN, HLD, Hypothyroid, HLD presents to  after fall at home . She reports tripping and falling outside on her deck around 6pm yesterday.  Pt fell backward hitting her head,  while holding 2 plants in her hand, denies LOC, + ASA use 81mg intermittently.  Denies Cp, dizziness, palpitations, cough, pain prior event. Patient did reports frontal headache 5/10, denies nausea, vomiting, numbness, weakness.  Pt had CTH of the brain  Elongated curvilinear hyperdense focus within the right thalamus  measuring up to 1.7 cm in craniocaudal dimension up to 2 cm in transverse  dimension.  ICH score of zero.   MRI of the brain -  acute right thalamus hemorrhage , repeat CT head  at 1:50 am Stable curvilinear hyperdensity along the right medial and posterior thalamus without surrounding edema. This most likely represents  calcification and not hemorrhage. Pt admitted for observation. Offers no c/o today, OOB ambulated to bathroom. no C/o.      PAST MEDICAL & SURGICAL HISTORY:  Anxiety  Hypothyroidism  Hyperlipidemia  HTN (hypertension)  H/O abdominal hysterectomy      FAMILY HISTORY:  No pertinent family history in first degree relatives      Social Hx:  Nonsmoker, no drug or alcohol use    MEDICATIONS  (STANDING):  atorvastatin 40 milliGRAM(s) Oral at bedtime  carvedilol 25 milliGRAM(s) Oral every 12 hours  cholecalciferol 1000 Unit(s) Oral daily  levothyroxine 75 MICROGram(s) Oral daily  losartan 100 milliGRAM(s) Oral daily       Allergies    No Known Allergies    ROS: Pertinent positives in HPI, all other ROS were reviewed and are negative.      Vital Signs Last 24 Hrs  T(C): 36.9 (26 May 2018 10:18), Max: 36.9 (26 May 2018 10:18)  T(F): 98.4 (26 May 2018 10:18), Max: 98.4 (26 May 2018 10:18)  HR: 72 (26 May 2018 10:18) (65 - 78)  BP: 100/42 (26 May 2018 10:18) (100/42 - 151/55)  BP(mean): --  RR: 18 (26 May 2018 10:18) (17 - 20)  SpO2: 98% (26 May 2018 10:18) (97% - 99%)        Constitutional: awake and alert.  HEENT: PERRLA, EOMI,   Neck: Supple.  Respiratory: Breath sounds are clear bilaterally  Cardiovascular: S1 and S2, regular  rhythm  Gastrointestinal: soft, nontender  Extremities:  no edema  Vascular:  Carotid Bruit - no  Musculoskeletal: no joint swelling/tenderness, no abnormal movements  Skin: No rashes    Neurological exam:  HF: A x O x 3. Appropriately interactive, normal affect. Speech fluent, No Aphasia or paraphasic errors. Naming /repetition intact   CN: MAURO, EOMI, VFF, facial sensation normal, no NLFD, tongue midline, Palate moves equally, SCM equal bilaterally  Motor: No pronator drift, Strength 5/5 in all 4 ext, normal bulk and tone, no tremor, rigidity or bradykinesia.    Sens: Intact to light touch / PP/ VS/ JS    Reflexes: +2 slightly brisk on left than right, downgoing toes b/l  Coord:  No FNFA, dysmetria, THERON intact   Gait/Balance: Normal    NIHSS: 0      Labs:   05-26    139  |  103  |  29<H>  ----------------------------<  88  3.9   |  28  |  1.29    Ca    9.1      26 May 2018 05:57  Phos  3.4     05-26  Mg     2.4     05-26    TPro  8.1  /  Alb  4.2  /  TBili  0.8  /  DBili  x   /  AST  19  /  ALT  28  /  AlkPhos  64  05-25 05-26 [id number] 5.4                          11.9   6.98  )-----------( 173      ( 26 May 2018 05:57 )             35.3       Radiology:  - CT Head: 5/25/18  < from: CT Head No Cont (05.25.18 @ 01:49) >  IMPRESSION:    Elongated curvilinear hyperdense focus within the right thalamus   measuring up to 1.7 cm in craniocaudal dimension up to 2 cm in transverse   dimension. No surrounding edema or significant mass effect upon the right   lateral ventricle. No intraventricular hemorrhage. Considerations include   thalamic hemorrhage, vascular malformation, or calcification. MRIof the   brain is recommended given lack of prior imaging for comparison.    - MRI brain 5/25/18  < from: MR Head No Cont (05.25.18 @ 05:59) >  IMPRESSION:       1. Unchanged acute hemorrhage in the right thalamus. This may relate to   trauma   but could also be related to a hypertensive hemorrhage.      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  < from: MR Angio Neck No Cont (05.25.18 @ 22:17) >    Normal neck MRA.

## 2018-05-26 NOTE — DISCHARGE NOTE ADULT - PLAN OF CARE
Prevention Take preventive meds as prescribed.  Goal is Blood pressure < 140 systolic.  Take anti-cholesterol medication.  Stop aspirin therapy until follow up for re-evaluation.  You should have CT scan of head in about 2-3 weeks.   Follow up with Dr. Guido neurologist or Dr. Alfonso neurosurgery. Caution with xanax as it can contribute to falls.  Consider long term therapy with an SSRI.  Discuss with your primary care doctor.

## 2018-05-26 NOTE — CONSULT NOTE ADULT - ASSESSMENT
85 yo female PMH HTN, HLD, Hypothyroid, Anxiety, who was at home today and sustained a fall outside on her wooden deck around 6pm.  Pt fell back while holding 2 plants in her hand hitting her head (Head vs wooden deck) no LOC, + ASA use 81mg intermittently. Pt continued with daily activities and daughter of patient saw her at 7pm and recommended going to urgent care, as pt had minor c/o nausea with a questionable minor HA    MR Head No Cont   Unchanged acute hemorrhage in the right thalamus. This may relate to trauma but could also be related to a hypertensive hemorrhage.    Pt at this time not symptomatic.    No intervention rec .    Hold aspirin.    F/u CT scan in 2-3 weeks as out PT.    F/u in office after d/c.    If stable resume activities.     Adv. not to drive till f/u in office.    Discussed with Pt.

## 2018-05-26 NOTE — PROGRESS NOTE ADULT - SUBJECTIVE AND OBJECTIVE BOX
Patient is a 86y old  Female who presents with a chief complaint of fall, headache (25 May 2018 14:54)      HPI:  85 yo female PMH HTN, HLD, Hypothyroid, Anxiety, who was at home today and sustained a fall outside on her wooden deck around 6pm.  Pt fell back while holding 2 plants in her hand hitting her head (Head vs wooden deck) no LOC, + ASA use 81mg intermittently. Pt continued with daily activities and daughter of patient saw her at 7pm and recommended going to urgent care, as pt had minor c/o nausea with a questionable minor HA.  Urgent care team recommended CTH evaluation as a screening protocol at .  Pt had CTH of the brain at 2am demonstrating a Right posterior thalamic 2cm oblong hyperdenisty ?hemorrhage, no IVH or hydrocephalus.  ICH score of zero.  Pt remains neuro intact with resolution of minor HA / feeling of nausea.  Pt asked to be evaluated by neurosurgery.     5/26 - Pt seen and examined with Dr Alfonso, in [facility]. Appears comfortable, denies new complaints, no overnight events     acetaminophen   Tablet. 650 milliGRAM(s) Oral every 6 hours PRN  ALPRAZolam 0.5 milliGRAM(s) Oral two times a day PRN  atorvastatin 40 milliGRAM(s) Oral at bedtime  bisacodyl Suppository 10 milliGRAM(s) Rectal once PRN  carvedilol 25 milliGRAM(s) Oral every 12 hours  cholecalciferol 1000 Unit(s) Oral daily  levothyroxine 75 MICROGram(s) Oral daily  losartan 100 milliGRAM(s) Oral daily  ondansetron Injectable 4 milliGRAM(s) IV Push every 6 hours PRN  senna 2 Tablet(s) Oral at bedtime PRN      Vital Signs Last 24 Hrs  T(C): 36.8 (26 May 2018 04:50), Max: 36.8 (25 May 2018 16:13)  T(F): 98.2 (26 May 2018 04:50), Max: 98.2 (25 May 2018 16:13)  HR: 65 (26 May 2018 04:50) (65 - 78)  BP: 129/55 (26 May 2018 04:50) (129/55 - 154/79)  BP(mean): --  RR: 17 (26 May 2018 04:50) (16 - 20)  SpO2: 97% (26 May 2018 04:50) (97% - 99%)                          11.9   6.98  )-----------( 173      ( 26 May 2018 05:57 )             35.3     139  |  103  |  29<H>  ----------------------------<  88  3.9   |  28  |  1.29    Ca    9.1      26 May 2018 05:57  Phos  3.4     05-26  Mg     2.4     05-26    TPro  8.1  /  Alb  4.2  /  TBili  0.8  /  DBili  x   /  AST  19  /  ALT  28  /  AlkPhos  64  05-25    PT/INR - ( 25 May 2018 05:07 )   PT: 10.7 sec;   INR: 0.99 ratio       PTT - ( 25 May 2018 05:07 )  PTT:29.2 sec      Radiology:  MR Head No Cont (05.25.18 @ 05:59) >  Unchanged acute hemorrhage in the right thalamus. This may relate to   trauma   but could also be related to a hypertensive hemorrhage.      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      Physical Exam:  Constitutional: Awake / alert  HEENT: PERRLA, EOMI  Neck: Supple  Respiratory: Breath sounds are clear bilaterally  Cardiovascular: S1 and S2, regular rhythm  Gastrointestinal: Soft, NT/ND  Extremities:  no edema  Vascular: No carotid Bruit  Musculoskeletal: no joint swelling/tenderness, no abnormal movements  Skin: No rashes    Neurological Exam:  HF: A x O x 3, appropriately interactive, normal affect, speech fluent, no aphasia or paraphasic errors. Naming /repetition intact   CN: PERRL, EOMI, VFF, facial sensation normal, no NLFD, tongue midline  Motor: No pronator drift, Strength 5/5 in all 4 ext, normal bulk and tone, no tremor, rigidity or bradykinesia  Sens: Intact to light touch  Reflexes: Symmetric and normal  Coord:  No FNFA, dysmetria, THERON intact   Gait/Balance: steady / no assist

## 2018-05-26 NOTE — PHYSICAL THERAPY INITIAL EVALUATION ADULT - PLANNED THERAPY INTERVENTIONS, PT EVAL
neuromuscular re-education/postural re-education/transfer training/gait training/balance training/stretching/strengthening

## 2018-05-26 NOTE — PHYSICAL THERAPY INITIAL EVALUATION ADULT - PERTINENT HX OF CURRENT PROBLEM, REHAB EVAL
86 female with PMH of Anxiety,  HTN, HLD, Hypothyroid, HLD presents to  after fall at home . She reports tripping and falling outside on her deck around 6pm.  Pt fell backward hitting her head,  while holding 2 plants in her hand.  CT head with Elongated curvilinear hyperdense focus within the right thalamus, No intraventricular hemorrhage., MRI brain given lack of prior imaging for comparison.

## 2018-05-26 NOTE — DISCHARGE NOTE ADULT - CARE PROVIDERS DIRECT ADDRESSES
[email or web address]_Unknown

## 2018-05-26 NOTE — DISCHARGE NOTE ADULT - CARE PROVIDER_API CALL
Vicenta Guido), Neurology  General  [address]  [address]  [place] [address]  Phone: [phone]  Fax: [phone]    Nigel Alfonso; PhD), Neurosurgery  [address] [place] [address]  Phone: [phone]  Fax: [phone]    Luzma Jackson), Medicine  [address] [place] [address]  Phone: [phone]  Fax: [phone]

## 2018-05-26 NOTE — PHYSICAL THERAPY INITIAL EVALUATION ADULT - RISK REDUCTION/PREVENTION, PT EVAL
Awake and alert.  Pain well controlled.    Visit Vitals  /77 (BP [place] [facility] [place] Position: Semi-Overton's)   Pulse 98   Temp 98.2 °F (36.8 °C) (Oral)   Resp 17   Ht 5' 3\" (1.6 m)   Wt 86.8 kg   LMP 09/08/2001   SpO2 97%   BMI 33.90 kg/m²       RLE:  Toes pink and warm, splint stable.    P:  1.  Discussed surgical findings and postop expectations.  Questions answered.  2.  Home today  3.  RTC 2-3 wks  ESM   risk factors

## 2018-05-30 DIAGNOSIS — F41.9 ANXIETY DISORDER, UNSPECIFIED: ICD-10-CM

## 2018-05-30 DIAGNOSIS — E78.5 HYPERLIPIDEMIA, UNSPECIFIED: ICD-10-CM

## 2018-05-30 DIAGNOSIS — E87.1 HYPO-OSMOLALITY AND HYPONATREMIA: ICD-10-CM

## 2018-05-30 DIAGNOSIS — Z79.899 OTHER LONG TERM (CURRENT) DRUG THERAPY: ICD-10-CM

## 2018-05-30 DIAGNOSIS — Z79.82 LONG TERM (CURRENT) USE OF ASPIRIN: ICD-10-CM

## 2018-05-30 DIAGNOSIS — E03.9 HYPOTHYROIDISM, UNSPECIFIED: ICD-10-CM

## 2018-05-30 DIAGNOSIS — R51 HEADACHE: ICD-10-CM

## 2018-05-30 DIAGNOSIS — I61.8 OTHER NONTRAUMATIC INTRACEREBRAL HEMORRHAGE: ICD-10-CM

## 2018-05-30 DIAGNOSIS — R73.9 HYPERGLYCEMIA, UNSPECIFIED: ICD-10-CM

## 2018-05-30 DIAGNOSIS — I10 ESSENTIAL (PRIMARY) HYPERTENSION: ICD-10-CM

## 2018-06-01 PROBLEM — S06.300D: Status: ACTIVE | Noted: 2018-06-01

## 2018-06-01 PROBLEM — G44.319 ACUTE POST-TRAUMATIC HEADACHE, NOT INTRACTABLE: Status: ACTIVE | Noted: 2018-06-01

## 2018-06-01 RX ORDER — LOSARTAN POTASSIUM 100 MG/1
100 TABLET, FILM COATED ORAL
Refills: 0 | Status: ACTIVE | COMMUNITY

## 2018-06-01 RX ORDER — CHOLECALCIFEROL (VITAMIN D3)
CRYSTALS MISCELLANEOUS
Refills: 0 | Status: ACTIVE | COMMUNITY

## 2018-06-01 RX ORDER — LEVOTHYROXINE SODIUM 0.07 MG/1
75 TABLET ORAL
Refills: 0 | Status: ACTIVE | COMMUNITY

## 2018-06-01 RX ORDER — CARVEDILOL 25 MG/1
25 TABLET, FILM COATED ORAL
Refills: 0 | Status: ACTIVE | COMMUNITY

## 2018-06-04 ENCOUNTER — APPOINTMENT (OUTPATIENT)
Dept: NEUROLOGY | Facility: CLINIC | Age: 83
End: 2018-06-04

## 2018-06-04 DIAGNOSIS — G44.319 ACUTE POST-TRAUMATIC HEADACHE, NOT INTRACTABLE: ICD-10-CM

## 2018-06-04 DIAGNOSIS — S06.300D: ICD-10-CM

## 2018-06-08 ENCOUNTER — RX RENEWAL (OUTPATIENT)
Age: 83
End: 2018-06-08

## 2018-06-08 DIAGNOSIS — I61.9 NONTRAUMATIC INTRACEREBRAL HEMORRHAGE, UNSPECIFIED: ICD-10-CM

## 2018-06-13 ENCOUNTER — APPOINTMENT (OUTPATIENT)
Dept: NEUROSURGERY | Facility: CLINIC | Age: 83
End: 2018-06-13
Payer: MEDICARE

## 2018-06-13 DIAGNOSIS — Z91.81 HISTORY OF FALLING: ICD-10-CM

## 2018-06-13 DIAGNOSIS — G93.89 OTHER SPECIFIED DISORDERS OF BRAIN: ICD-10-CM

## 2018-06-13 PROCEDURE — 99214 OFFICE O/P EST MOD 30 MIN: CPT

## 2018-06-14 VITALS
RESPIRATION RATE: 18 BRPM | HEART RATE: 70 BPM | BODY MASS INDEX: 27.45 KG/M2 | HEIGHT: 62.76 IN | DIASTOLIC BLOOD PRESSURE: 79 MMHG | WEIGHT: 153 LBS | SYSTOLIC BLOOD PRESSURE: 169 MMHG | TEMPERATURE: 98.4 F

## 2018-06-14 PROBLEM — G93.89 INTRACRANIAL CALCIFICATION: Status: ACTIVE | Noted: 2018-06-14

## 2018-06-14 PROBLEM — Z91.81 STATUS POST FALL: Status: ACTIVE | Noted: 2018-06-14

## 2018-06-14 RX ORDER — SULFAMETHOXAZOLE AND TRIMETHOPRIM 800; 160 MG/1; MG/1
800-160 TABLET ORAL TWICE DAILY
Qty: 20 | Refills: 0 | Status: DISCONTINUED | COMMUNITY
Start: 2017-11-21 | End: 2018-06-14

## 2018-06-14 RX ORDER — ATORVASTATIN CALCIUM 40 MG/1
40 TABLET, FILM COATED ORAL
Refills: 0 | Status: DISCONTINUED | COMMUNITY
End: 2018-06-14

## 2018-06-14 RX ORDER — NITROFURANTOIN (MONOHYDRATE/MACROCRYSTALS) 25; 75 MG/1; MG/1
100 CAPSULE ORAL
Qty: 14 | Refills: 1 | Status: DISCONTINUED | COMMUNITY
Start: 2017-11-17 | End: 2018-06-14

## 2018-06-14 RX ORDER — NITROFURANTOIN MONOHYDRATE/MACROCRYSTALLINE 25; 75 MG/1; MG/1
100 CAPSULE ORAL TWICE DAILY
Qty: 14 | Refills: 0 | Status: DISCONTINUED | COMMUNITY
Start: 2017-11-21 | End: 2018-06-14

## 2018-06-14 RX ORDER — ALPRAZOLAM 0.5 MG/1
0.5 TABLET ORAL
Refills: 0 | Status: ACTIVE | COMMUNITY

## 2018-06-14 RX ORDER — CEPHALEXIN 500 MG/1
500 CAPSULE ORAL 3 TIMES DAILY
Qty: 21 | Refills: 0 | Status: DISCONTINUED | COMMUNITY
Start: 2017-11-21 | End: 2018-06-14

## 2018-07-23 ENCOUNTER — APPOINTMENT (OUTPATIENT)
Dept: NEUROLOGY | Facility: CLINIC | Age: 83
End: 2018-07-23

## 2019-03-16 NOTE — PATIENT PROFILE ADULT. - NS TRANSFER PATIENT BELONGINGS
POSTPARTUM DISCHARGE INSTRUCTIONS FOR MOM    YOB: 1982   Age: 36 y.o.               Admit Date: 3/14/2019     Discharge Date: 3/16/2019  Attending Doctor:  Queta Moore M.D.                  Allergies:  Codeine    Discharged to home by car. Discharged via wheelchair, hospital escort: Yes.  Special equipment needed: Not Applicable  Belongings with: Personal  Be sure to schedule a follow-up appointment with your primary care doctor or any specialists as instructed.     Discharge Plan:   Diet Plan: Discussed  Activity Level: Discussed  Confirmed Follow up Appointment: Patient to Call and Schedule Appointment  Confirmed Symptoms Management: Discussed  Medication Reconciliation Updated: Yes  Influenza Vaccine Indication: Not indicated: Previously immunized this influenza season and > 8 years of age    REASONS TO CALL YOUR OBSTETRICIAN:  1.   Persistent fever or shaking chills (Temperature higher than 100.4)  2.   Heavy bleeding (soaking more than 1 pad per hour); Passing clots  3.   Foul odor from vagina  4.   Mastitis (Breast infection; breast pain, chills, fever, redness)  5.   Urinary pain, burning or frequency  6.   Episiotomy infection  7.   Severe depression longer than 24 hours    HAND WASHING  · Prior to handling the baby.  · Before breastfeeding or bottle feeding baby.  · After using the bathroom or changing the baby's diaper.    VAGINAL CARE  · Nothing inside vagina for 6 weeks: no sexual intercourse, tampons or douching.  · Bleeding may continue for 2-4 weeks.  Amount may vary.    · Call your physician for heavy bleeding which means soaking more than 1 pad per hour    BIRTH CONTROL  · It is possible to become pregnant at any time after delivery and while breastfeeding.  · Plan to discuss a method of birth control with your physician at your follow up visit. visit.    DIET AND ELIMINATION  · Eating more fiber (bran cereal, fruits, and vegetables) and drinking plenty of fluids will help to avoid  "constipation.  · Urinary frequency after childbirth is normal.    POSTPARTUM BLUES  During the first few days after birth, you may experience a sense of the \"blues\" which may include impatience, irritability or even crying.  These feeling come and go quickly.  However, as many as 1 in 10 women experience emotional symptoms known as postpartum depression.  Postpartum depression:  May start as early as the second or third day after delivery or take several weeks or months to develop.  Symptoms of \"blues\" are present, but are more intense:  Crying spells; loss of appetite; feelings of hopelessness or loss of control; fear of touching the baby; over concern or no concern at all about the baby; little or no concern about your own appearance/caring for yourself; and/or inability to sleep or excessive sleeping.  Contact your physician if you are experiencing any of these symptoms.  Crisis Hotline:  · [place] Crisis Hotline:  2-678-UUBQSVB  Or 1-937.728.3745  · [place] Crisis Hotline:  1-810.282.3046  Or 584-174-8975    PREVENTING SHAKEN BABY:  If you are angry or stressed, PUT THE BABY IN THE CRIB, step away, take some deep breaths, and wait until you are calm to care for the baby.  DO NOT SHAKE THE BABY.  You are not alone, call a supporter for help.  · Crisis Call Center 24/7 crisis line 377-553-9159 or 1-884.357.3376  · You can also text them, text \"ANSWER\" to 976598    QUIT SMOKING/TOBACCO USE:  I understand the use of any tobacco products increases my chance of suffering from future heart disease and could cause other illnesses which may shorten my life. Quitting the use of tobacco products is the single most important thing I can do to improve my health. For further information on smoking / tobacco cessation call a Toll Free Quit Line at 1-638.169.8996 (*[employer] [place]) or 1-350.304.1139 (American Lung Association) or you can access the web based program at www.lungusa.org.  · [place] Tobacco Users Help " Line:  (618) 877-1893       Toll Free: 1-366.931.8548  · Quit Tobacco Program [facility] (643)844-1247    DEPRESSION / SUICIDE RISK:  As you are discharged from this [facility], it is important to learn how to keep safe from harming yourself.    Recognize the warning signs:  · Abrupt changes in personality, positive or negative- including increase in energy   · Giving away possessions  · Change in eating patterns- significant weight changes-  positive or negative  · Change in sleeping patterns- unable to sleep or sleeping all the time   · Unwillingness or inability to communicate  · Depression  · Unusual sadness, discouragement and loneliness  · Talk of wanting to die  · Neglect of personal appearance   · Rebelliousness- reckless behavior  · Withdrawal from people/activities they love  · Confusion- inability to concentrate     If you or a loved one observes any of these behaviors or has concerns about self-harm, here's what you can do:  · Talk about it- your feelings and reasons for harming yourself  · Remove any means that you might use to hurt yourself (examples: pills, rope, extension cords, firearm)  · Get professional help from the community (Mental Health, Substance Abuse, psychological counseling)  · Do not be alone:Call your Safe Contact- someone whom you trust who will be there for you.  · Call your local CRISIS HOTLINE 195-7161 or 248-208-3101  · Call your local Children's Mobile Crisis Response Team [place] (981) 491-4593 or www.TwoTen  · Call the toll free National Suicide Prevention Hotlines   · National Suicide Prevention Lifeline 958-826-CFIF (5542)  · National Hope Line Network 800-SUICIDE (844-5365)    DISCHARGE SURVEY:  Thank you for choosing [facility].  We hope we provided you with very good care.  You may be receiving a survey in the mail.  Please fill it out.  Your opinion is valuable to us.    My signature on this form indicates that:  1.   I have reviewed and understand the above information  2.  My questions regarding this information have been answered to my satisfaction.  3.  I have formulated a plan with my discharge nurse to obtain my prescribed medication for home.     Cell Phone/PDA (specify)/Clothing

## 2019-08-29 PROBLEM — F41.9 ANXIETY DISORDER, UNSPECIFIED: Chronic | Status: ACTIVE | Noted: 2018-05-25

## 2019-08-29 PROBLEM — E78.5 HYPERLIPIDEMIA, UNSPECIFIED: Chronic | Status: ACTIVE | Noted: 2018-05-25

## 2019-08-29 PROBLEM — I10 ESSENTIAL (PRIMARY) HYPERTENSION: Chronic | Status: ACTIVE | Noted: 2018-05-25

## 2019-08-29 PROBLEM — E03.9 HYPOTHYROIDISM, UNSPECIFIED: Chronic | Status: ACTIVE | Noted: 2018-05-25

## 2019-09-09 ENCOUNTER — APPOINTMENT (OUTPATIENT)
Dept: OBGYN | Facility: CLINIC | Age: 84
End: 2019-09-09
Payer: MEDICARE

## 2019-09-09 VITALS
BODY MASS INDEX: 27.02 KG/M2 | HEIGHT: 62 IN | DIASTOLIC BLOOD PRESSURE: 70 MMHG | SYSTOLIC BLOOD PRESSURE: 140 MMHG | WEIGHT: 146.83 LBS

## 2019-09-09 DIAGNOSIS — R92.1 MAMMOGRAPHIC CALCIFICATION FOUND ON DIAGNOSTIC IMAGING OF BREAST: ICD-10-CM

## 2019-09-09 DIAGNOSIS — R92.2 INCONCLUSIVE MAMMOGRAM: ICD-10-CM

## 2019-09-09 PROCEDURE — 99212 OFFICE O/P EST SF 10 MIN: CPT

## 2019-09-10 ENCOUNTER — CLINICAL ADVICE (OUTPATIENT)
Age: 84
End: 2019-09-10

## 2019-12-20 NOTE — ED ADULT NURSE NOTE - CHPI ED SYMPTOMS POS
Pt called. She states she did try calling back earlier in the week, but was on hold for too long. She denies any UTI sx (no dysuria, hematuria, frequency or urgency). She states that she has been drinking cranberry juice and h20.  She has also been taking A BRUISING

## 2020-05-26 NOTE — ED PROVIDER NOTE - CROS ED ALLERGIC IMMUN ALL NEG
Baseline weight 151 lb.     appx 2 days ago, Pt reported to daughter that she did not need to take losartan any longer. Pt dtr calling to check if this was discontinued.   On chart review, Pt will need to continue losartan.   Advised Pt and Pt dtr to monitor BP and HR and call back with update, and to call sooner if every reading is above 140 or below 90.   Verbalized understanding, no further questions at this time.     Per recent nephrology note 5/7, Pt instructed to take furosemide PRN at this time.   At time of last clinic visit 1/7/2020, Pt had refill for losartan 50 mg PO daily reordered.   
Carmen called re: Losartan dose and direction clarification please call to advise   
Pt dtr called to update.   Pt actually  Has been taking losartan.   Pt and Pt dtr clarified, will continue to take and  Call with update in a few weeks.   
negative...

## 2020-10-21 NOTE — ED PROVIDER NOTE - MUSCULOSKELETAL, MLM
10/21/2020    TELEHEALTH EVALUATION -- Audio/Visual (During LDFKV-22 public health emergency)    HPI:    Garth Ricks (:  1959) has requested an audio/video evaluation for the following concern(s):    Patient is seen today via a video visit for follow up of chronic health issues Patient did recently get diagnosed with Covid so we decided to do her routine office visit over the phone. She does note that she quit using the Trulicity after 2 times of using it as she did not know if she was doing it correctly and did not feel like there was anything injecting into her skin when she would use the medical therapy. She quit using it and notes that her blood sugars have been suboptimally controlled. She also states that she was doing her Lantus as directed but somehow ran out of medication a week and a half early. As a result her blood sugars have been running in the 500s. She states she called the pharmacy and they advised her that she would not be able to get it filled until tomorrow. Not sure what happened but I advised her that she should be getting 3 pens to get her through for 1 month. When she goes the pharmacy she needs to make sure that she is getting 3 pens of the Lantus. Also I advised her to go back on the Trulicity and we did discuss the correct usage of the Trulicity. I advised her she uses it 2 more times and does not feel any needle stick she will have to come into the office and we will give her dose to see if it is working appropriately. She does express understanding of this. She does have a known history of hyperlipidemia and her cholesterol levels are stable and adequately controlled on her current statin medication. Has a known history of hypothyroidism and her thyroid levels are stable and adequately supplemented on her current thyroid dose. Has a known history of hypertension her blood pressure has remained stable on her current medical therapy.   Does have known anxiety which is stable controlled on her current medical therapy. Does use lorazepam to help with her acute anxiety and does use this medication appropriately. Has a known history of insomnia but is not having significant issues at this time. Does seem to be doing well with regards to her acute COVID-19 infection. Had not been out of around anybody that she knows of that had COVID-19. She states she felt achy and had some shortness of breath and is still feeling somewhat short of breath but improving overall. Patient otherwise has no other acute medical concerns at this time. .  Patient's recent lab reports are as follows:    Results for orders placed or performed during the hospital encounter of 10/20/20   T4, Free   Result Value Ref Range    Thyroxine, Free 1.43 0.93 - 1.70 ng/dL   TSH without Reflex   Result Value Ref Range    TSH 0.76 0.30 - 5.00 mIU/L   Lipid Panel   Result Value Ref Range    Cholesterol 159 <200 mg/dL    HDL 43 >40 mg/dL    LDL Cholesterol 83 0 - 130 mg/dL    Chol/HDL Ratio 3.7 <5    Triglycerides 164 (H) <150 mg/dL    VLDL NOT REPORTED (H) 1 - 30 mg/dL   Hemoglobin A1C   Result Value Ref Range    Hemoglobin A1C 9.6 (H) 4.8 - 5.9 %    Estimated Avg Glucose 229 mg/dL   Comprehensive Metabolic Panel   Result Value Ref Range    Glucose 199 (H) 70 - 99 mg/dL    BUN 17 8 - 23 mg/dL    CREATININE 0.82 0.50 - 0.90 mg/dL    Bun/Cre Ratio 21 (H) 9 - 20    Calcium 9.8 8.6 - 10.4 mg/dL    Sodium 139 135 - 144 mmol/L    Potassium 3.8 3.7 - 5.3 mmol/L    Chloride 102 98 - 107 mmol/L    CO2 25 20 - 31 mmol/L    Anion Gap 12 9 - 17 mmol/L    Alkaline Phosphatase 100 35 - 104 U/L    ALT 16 5 - 33 U/L    AST 15 <32 U/L    Total Bilirubin 1.01 0.3 - 1.2 mg/dL    Total Protein 8.4 (H) 6.4 - 8.3 g/dL    Alb 4.1 3.5 - 5.2 g/dL    Albumin/Globulin Ratio 1.0 1.0 - 2.5    GFR Non-African American >60 >60 mL/min    GFR African American >60 >60 mL/min    GFR Comment          GFR Staging NOT REPORTED    CBC Auto Differential Result Value Ref Range    WBC 6.2 3.5 - 11.3 k/uL    RBC 4.23 3.95 - 5.11 m/uL    Hemoglobin 12.6 11.9 - 15.1 g/dL    Hematocrit 39.1 36.3 - 47.1 %    MCV 92.4 82.6 - 102.9 fL    MCH 29.8 25.2 - 33.5 pg    MCHC 32.2 25.2 - 33.5 g/dL    RDW 13.5 11.8 - 14.4 %    Platelets 964 503 - 985 k/uL    MPV 10.5 8.1 - 13.5 fL    NRBC Automated 0.0 0.0 per 100 WBC    Differential Type NOT REPORTED     Seg Neutrophils 67 (H) 36 - 65 %    Lymphocytes 26 24 - 43 %    Monocytes 4 3 - 12 %    Eosinophils % 1 1 - 4 %    Basophils 1 0 - 2 %    Immature Granulocytes 1 (H) 0 %    Segs Absolute 4.19 1.50 - 8.10 k/uL    Absolute Lymph # 1.60 1.10 - 3.70 k/uL    Absolute Mono # 0.22 0.10 - 1.20 k/uL    Absolute Eos # 0.05 0.00 - 0.44 k/uL    Basophils Absolute 0.03 0.00 - 0.20 k/uL    Absolute Immature Granulocyte 0.06 0.00 - 0.30 k/uL    WBC Morphology NOT REPORTED     RBC Morphology NOT REPORTED     Platelet Estimate NOT REPORTED       Other review of systems are as noted below. Review of Systems   Constitutional: Positive for fatigue (from recent covid 19 infection). Negative for chills and fever. HENT: Negative for congestion, ear pain, postnasal drip, rhinorrhea, sinus pressure, sore throat and trouble swallowing. Eyes: Negative for discharge and redness. Respiratory: Positive for shortness of breath (slight from recent covid 19 infection). Negative for cough and wheezing. Cardiovascular: Negative for chest pain. Gastrointestinal: Negative for abdominal pain, constipation, diarrhea, nausea and vomiting. Genitourinary: Negative for dysuria, flank pain, frequency and urgency. Musculoskeletal: Negative for arthralgias, myalgias and neck pain. Skin: Negative for rash and wound. Allergic/Immunologic: Negative for environmental allergies. Neurological: Negative for dizziness, weakness, light-headedness and headaches. Hematological: Negative for adenopathy. Psychiatric/Behavioral: Negative.         Prior to Visit Medications    Medication Sig Taking? Authorizing Provider   sertraline (ZOLOFT) 100 MG tablet take 1 AND 1/2 tablets by mouth once daily  Susie Mtz DO   lisinopril (PRINIVIL;ZESTRIL) 20 MG tablet take 1 tablet by mouth once daily  Susie Mtz DO   Insulin Pen Needle (RA PEN NEEDLES) 31G X 5 MM MISC Uses 6 per day  Susie Mtz DO   rOPINIRole (REQUIP) 0.5 MG tablet Take 1 tablet by mouth daily  Susie Mtz DO   tiZANidine (ZANAFLEX) 2 MG tablet Take 1 tablet by mouth 3 times daily as needed (spasm or pain)  Patient not taking: Reported on 10/12/2020  Markham Gaucher, DO   insulin glargine (LANTUS SOLOSTAR) 100 UNIT/ML injection pen Inject 15 Units into the skin 2 times daily  Susie Mtz DO   LORazepam (ATIVAN) 0.5 MG tablet Take 1 tablet by mouth every 12 hours as needed for Anxiety for up to 90 days.   Markham Gaucher, DO   Dulaglutide (TRULICITY) 8.66 FO/4.8BM SOPN Inject 0.75 mg into the skin once a week  Susie Mtz DO   levothyroxine (SYNTHROID) 175 MCG tablet take 1 tablet by mouth once daily  Susie Mtz DO   amitriptyline (ELAVIL) 25 MG tablet take 1 tablet by mouth at bedtime  Markham Gaucher, DO   blood glucose test strips (TRUE METRIX BLOOD GLUCOSE TEST) strip TEST three times a day  Susie Mtz DO   simvastatin (ZOCOR) 40 MG tablet take 1 tablet by mouth once daily  Susie Mtz DO   busPIRone (BUSPAR) 7.5 MG tablet take 1 tablet by mouth twice a day  Susie Jung DO   insulin lispro, 1 Unit Dial, (HUMALOG KWIKPEN) 100 UNIT/ML SOPN Inject 12 Units into the skin 3 times daily (before meals)  Markham Gaucher, DO   hydrochlorothiazide (HYDRODIURIL) 12.5 MG tablet Take 1 tablet by mouth daily  Susie Mtz DO   ammonium lactate (LAC-HYDRIN) 12 % lotion APPLY TOPICALLY DAILY  Markham Gaucher, DO       Social History     Tobacco Use    Smoking status: Never Smoker    Smokeless tobacco: Never Used   Substance Use diabetes mellitus with hyperglycemia ([facility] [address] Yes    Mixed hyperlipidemia     Acquired hypothyroidism     Essential hypertension     Anxiety     Primary insomnia     COVID-19     Encounter for screening mammogram for malignant neoplasm of breast      Orders Placed This Encounter   Medications    LORazepam (ATIVAN) 0.5 MG tablet     Sig: Take 1 tablet by mouth every 12 hours as needed for Anxiety for up to 90 days. Dispense:  60 tablet     Refill:  2     60 pills to last for 30 days     Orders Placed This Encounter   Procedures    VARSHA KING DIGITAL SCREEN BILATERAL     Standing Status:   Future     Standing Expiration Date:   12/21/2021     Order Specific Question:   Reason for exam:     Answer:   screening mammogram    CBC Auto Differential     Standing Status:   Future     Standing Expiration Date:   10/21/2021    Comprehensive Metabolic Panel     Standing Status:   Future     Standing Expiration Date:   10/21/2021    Hemoglobin A1C     Standing Status:   Future     Standing Expiration Date:   10/21/2021    Lipid Panel     Standing Status:   Future     Standing Expiration Date:   10/21/2021     Order Specific Question:   Is Patient Fasting?/# of Hours     Answer:   12 hours    T4, Free     Standing Status:   Future     Standing Expiration Date:   10/21/2021    TSH without Reflex     Standing Status:   Future     Standing Expiration Date:   10/21/2021    Droplet Plus Isolation - (Use only for COVID-19)     Standing Status:   Standing     Number of Occurrences:   1     As noted above do want her to use the Trulicity at least another couple of times and see if she feels the medicine injecting. If she does not she is to come in here and we will monitor her while she gives her dose to see if she is doing something incorrectly. She explained to me how she utilized it and it sounds like she was doing it correctly. Also I need her to get back on her Lantus therapy.   She does need to check with the pharmacy tomorrow when she gets it filled to make sure that she is getting an adequate supply for 1 month duration. This likely is why her sugars have elevated because she has been without her Lantus and also was not taking the Trulicity. Patient is to continue to follow a low-carb/low sugar/low-fat diet and increase exercise for optimal blood sugar control. Patient is to symptomatically treat the symptoms of COVID-19. Seems to be recovering well but do want her to get plenty of fluids and rest and eat healthy. Patient is to continue on the rest of her current medical therapy. No additional changes are made at this time. Controlled Substance Monitoring:    Acute and Chronic Pain Monitoring:   RX Monitoring 10/21/2020   Attestation -   Periodic Controlled Substance Monitoring Possible medication side effects, risk of tolerance/dependence & alternative treatments discussed. ;No signs of potential drug abuse or diversion identified. ;Assessed functional status. Chronic Pain > 80 MEDD Obtained or confirmed \"Medication Contract\" on file. Patient is to return to my office in 3 months duration or sooner if any further problems or symptoms arise. (Please note that portions of this note were completed with a voice recognition program. Efforts were made to edit the dictations but occasionally words are mis-transcribed.)        No follow-ups on file. Sola Hernandez is a 61 y.o. female being evaluated by a Virtual Visit (video visit) encounter to address concerns as mentioned above. A caregiver was present when appropriate. Due to this being a TeleHealth encounter (During CUPUC-01 public health emergency), evaluation of the following organ systems was limited: Vitals/Constitutional/EENT/Resp/CV/GI//MS/Neuro/Skin/Heme-Lymph-Imm.   Pursuant to the emergency declaration under the [address] [facility] [address] waiver authority and the [employer] Spine appears normal, range of motion is not limited, no muscle or joint tenderness

## 2021-01-14 NOTE — PATIENT PROFILE ADULT. - PATIENT REPRESENTATIVE NAME
Detail Level: Simple
Number Of Freeze-Thaw Cycles: 3 freeze-thaw cycles
Render Note In Bullet Format When Appropriate: No
Consent: The patient's consent was obtained including but not limited to risks of crusting, scabbing, blistering, scarring, darker or lighter pigmentary change, recurrence, incomplete removal and infection.
Duration Of Freeze Thaw-Cycle (Seconds): 3
Render Post-Care Instructions In Note?: yes
Post-Care Instructions: I reviewed with the patient in detail post-care instructions. Patient is to wear sunprotection, and avoid picking at any of the treated lesions. Pt may apply Vaseline to crusted or scabbing areas.
Masha Wynn (DAUGHTER)

## 2022-06-13 NOTE — PATIENT PROFILE ADULT. - FALLEN IN THE PAST
CHW reached out to pt to reschedule virtual appointment with DAGOBERTO RenaeW, pt asked that CHW call her sister on tomorrow to reschedule virtual appointment.     yes

## 2023-11-02 ENCOUNTER — APPOINTMENT (OUTPATIENT)
Dept: NEUROLOGY | Facility: CLINIC | Age: 88
End: 2023-11-02

## 2024-04-15 NOTE — COUNSELING
----- Message from Nurys Martinez MD sent at 4/15/2024 12:44 PM CDT -----  Normal mammogram, update epic     [Breast Self Exam] : breast self exam [Nutrition] : nutrition [Exercise] : exercise [Vitamins/Supplements] : vitamins/supplements [Weight Management] : weight management [Lab Results] : lab results
